# Patient Record
Sex: MALE | Race: WHITE | NOT HISPANIC OR LATINO | Employment: FULL TIME | URBAN - METROPOLITAN AREA
[De-identification: names, ages, dates, MRNs, and addresses within clinical notes are randomized per-mention and may not be internally consistent; named-entity substitution may affect disease eponyms.]

---

## 2019-10-12 ENCOUNTER — HOSPITAL ENCOUNTER (EMERGENCY)
Facility: HOSPITAL | Age: 39
Discharge: HOME | End: 2019-10-12
Attending: EMERGENCY MEDICINE
Payer: OTHER MISCELLANEOUS

## 2019-10-12 VITALS
TEMPERATURE: 98 F | OXYGEN SATURATION: 99 % | SYSTOLIC BLOOD PRESSURE: 145 MMHG | HEART RATE: 90 BPM | RESPIRATION RATE: 18 BRPM | WEIGHT: 195 LBS | BODY MASS INDEX: 26.41 KG/M2 | DIASTOLIC BLOOD PRESSURE: 76 MMHG | HEIGHT: 72 IN

## 2019-10-12 DIAGNOSIS — M25.562 ACUTE PAIN OF LEFT KNEE: Primary | ICD-10-CM

## 2019-10-12 DIAGNOSIS — M25.572 ACUTE LEFT ANKLE PAIN: ICD-10-CM

## 2019-10-12 DIAGNOSIS — R52 PAIN: ICD-10-CM

## 2019-10-12 PROCEDURE — 25000003 PHARM REV CODE 250: Performed by: NURSE PRACTITIONER

## 2019-10-12 PROCEDURE — 99283 EMERGENCY DEPT VISIT LOW MDM: CPT | Mod: 25

## 2019-10-12 RX ORDER — DICLOFENAC SODIUM 50 MG/1
50 TABLET, DELAYED RELEASE ORAL 3 TIMES DAILY PRN
Qty: 20 TABLET | Refills: 2 | Status: SHIPPED | OUTPATIENT
Start: 2019-10-12

## 2019-10-12 RX ORDER — METHOCARBAMOL 500 MG/1
500 TABLET, FILM COATED ORAL
Status: COMPLETED | OUTPATIENT
Start: 2019-10-12 | End: 2019-10-12

## 2019-10-12 RX ORDER — HYDROCODONE BITARTRATE AND ACETAMINOPHEN 5; 325 MG/1; MG/1
1 TABLET ORAL
Status: COMPLETED | OUTPATIENT
Start: 2019-10-12 | End: 2019-10-12

## 2019-10-12 RX ADMIN — HYDROCODONE BITARTRATE AND ACETAMINOPHEN 1 TABLET: 5; 325 TABLET ORAL at 04:10

## 2019-10-12 RX ADMIN — METHOCARBAMOL 500 MG: 500 TABLET, FILM COATED ORAL at 04:10

## 2019-10-12 NOTE — ED PROVIDER NOTES
Encounter Date: 10/12/2019       History     Chief Complaint   Patient presents with    Knee Injury     WAS IN BUILDING THAT HAD COLLAPSED IN COREY TODAY, NO LOC, FELL DOWN SEVERAL STEPS GETTING DOWN    Ankle Injury     Presents with complaint of left ankle and left knee pain  He was coming down a scaffolding when the building collapsed at the [place]         Review of patient's allergies indicates:  No Known Allergies  History reviewed. No pertinent past medical history.  Past Surgical History:   Procedure Laterality Date    ANKLE SX      APPENDECTOMY       No family history on file.  Social History     Tobacco Use    Smoking status: Never Smoker   Substance Use Topics    Alcohol use: Yes    Drug use: Not on file     Review of Systems   Constitutional: Negative for fever.   Respiratory: Negative for cough, shortness of breath and wheezing.    Cardiovascular: Negative for chest pain, palpitations and leg swelling.   Gastrointestinal: Negative for abdominal pain, diarrhea, nausea and vomiting.   Musculoskeletal: Negative for back pain and joint swelling.        Left knee and left ankle pain   Skin: Negative for rash.   Neurological: Negative for weakness.       Physical Exam     Initial Vitals [10/12/19 1601]   BP Pulse Resp Temp SpO2   (!) 145/76 106 20 98.2 °F (36.8 °C) 96 %      MAP       --         Physical Exam    Constitutional: He appears well-developed and well-nourished.   HENT:   Mouth/Throat: Oropharynx is clear and moist.   Eyes: Conjunctivae are normal.   Neck: Normal range of motion. Neck supple.   Cardiovascular: Normal rate.   Pulmonary/Chest: Breath sounds normal.   Abdominal: Soft. Bowel sounds are normal.   Musculoskeletal: Normal range of motion.   Pain to left ankle  Pt has full ROM There is abrasion to the left knee  Pt also has full ROM to the left knee with mild tenderness   Neurological: He is alert and oriented to person, place, and time.   Skin: Skin is warm. Capillary refill  takes less than 2 seconds.   Small abrasion to left knee   Psychiatric: He has a normal mood and affect.         ED Course   Splint Application  Date/Time: 10/12/2019 6:06 PM  Performed by: Suyapa Riojas NP  Authorized by: Suyapa Riojas NP   Supplies used: elastic bandage  Post-procedure: The splinted body part was neurovascularly unchanged following the procedure.  Patient tolerance: Patient tolerated the procedure well with no immediate complications  Comments: Ace wrap to left knee and left ankle        Labs Reviewed - No data to display       Imaging Results          X-Ray Knee Complete 4 or more Views Left (Final result)  Result time 10/12/19 16:44:31   Procedure changed from X-Ray Knee 3 View Left     Final result by Rob Felix MD (10/12/19 16:44:31)                 Impression:      Normal left knee.      Electronically signed by: Rob Felix MD  Date:    10/12/2019  Time:    16:44             Narrative:    EXAMINATION:  XR KNEE COMP 4 OR MORE VIEWS LEFT    CLINICAL HISTORY:  trauma; Pain, unspecified left knee trauma    FINDINGS:  Four views of left knee show no fracture, dislocation, or destructive osseous lesion. Soft tissues are unremarkable.                               X-Ray Ankle Complete Left (Final result)  Result time 10/12/19 17:09:35    Final result by Rob Felix MD (10/12/19 17:09:35)                 Impression:      No acute left ankle abnormality.      Electronically signed by: Rob Felix MD  Date:    10/12/2019  Time:    17:09             Narrative:    EXAMINATION:  XR ANKLE COMPLETE 3 VIEW LEFT    CLINICAL HISTORY:  Left ankle trauma    FINDINGS:  Three views of left ankle show no acute fracture, dislocation, or destructive osseous lesion.  Corticated ossicle along inferior medial malleolus suggest chronic ununited avulsion fracture.  Soft tissues are unremarkable.                                 Medical Decision Making:   Initial Assessment:   Left knee and left ankle  pain  Have discussed this pt with Dr. Eng              Attending Attestation:     Physician Attestation Statement for NP/PA:   I discussed this assessment and plan of this patient with the NP/PA, but I did not personally examine the patient. The face to face encounter was performed by the NP/PA.                     Clinical Impression:       ICD-10-CM ICD-9-CM   1. Acute pain of left knee M25.562 719.46   2. Pain R52 780.96   3. Acute left ankle pain M25.572 719.47                                Suyapa Riojas NP  10/12/19 1809       Tyson Eng MD  10/13/19 [id number]